# Patient Record
Sex: MALE | Race: OTHER | HISPANIC OR LATINO | URBAN - METROPOLITAN AREA
[De-identification: names, ages, dates, MRNs, and addresses within clinical notes are randomized per-mention and may not be internally consistent; named-entity substitution may affect disease eponyms.]

---

## 2023-02-16 ENCOUNTER — EMERGENCY (EMERGENCY)
Facility: HOSPITAL | Age: 23
LOS: 1 days | Discharge: HOME | End: 2023-02-16
Attending: EMERGENCY MEDICINE | Admitting: EMERGENCY MEDICINE
Payer: SELF-PAY

## 2023-02-16 VITALS
WEIGHT: 123.46 LBS | SYSTOLIC BLOOD PRESSURE: 119 MMHG | OXYGEN SATURATION: 98 % | HEART RATE: 78 BPM | DIASTOLIC BLOOD PRESSURE: 76 MMHG | HEIGHT: 61 IN | TEMPERATURE: 98 F | RESPIRATION RATE: 16 BRPM

## 2023-02-16 VITALS
DIASTOLIC BLOOD PRESSURE: 66 MMHG | OXYGEN SATURATION: 97 % | RESPIRATION RATE: 16 BRPM | HEART RATE: 68 BPM | TEMPERATURE: 98 F | SYSTOLIC BLOOD PRESSURE: 121 MMHG

## 2023-02-16 LAB
ALBUMIN SERPL ELPH-MCNC: 4 G/DL — SIGNIFICANT CHANGE UP (ref 3.3–5)
ALP SERPL-CCNC: 82 U/L — SIGNIFICANT CHANGE UP (ref 30–120)
ALT FLD-CCNC: 25 U/L DA — SIGNIFICANT CHANGE UP (ref 10–60)
ANION GAP SERPL CALC-SCNC: 6 MMOL/L — SIGNIFICANT CHANGE UP (ref 5–17)
APPEARANCE UR: CLEAR — SIGNIFICANT CHANGE UP
AST SERPL-CCNC: 26 U/L — SIGNIFICANT CHANGE UP (ref 10–40)
BACTERIA # UR AUTO: NEGATIVE — SIGNIFICANT CHANGE UP
BASOPHILS # BLD AUTO: 0.05 K/UL — SIGNIFICANT CHANGE UP (ref 0–0.2)
BASOPHILS NFR BLD AUTO: 0.7 % — SIGNIFICANT CHANGE UP (ref 0–2)
BILIRUB SERPL-MCNC: 0.2 MG/DL — SIGNIFICANT CHANGE UP (ref 0.2–1.2)
BILIRUB UR-MCNC: NEGATIVE — SIGNIFICANT CHANGE UP
BUN SERPL-MCNC: 10 MG/DL — SIGNIFICANT CHANGE UP (ref 7–23)
CALCIUM SERPL-MCNC: 8.9 MG/DL — SIGNIFICANT CHANGE UP (ref 8.4–10.5)
CHLORIDE SERPL-SCNC: 104 MMOL/L — SIGNIFICANT CHANGE UP (ref 96–108)
CO2 SERPL-SCNC: 29 MMOL/L — SIGNIFICANT CHANGE UP (ref 22–31)
COLOR SPEC: YELLOW — SIGNIFICANT CHANGE UP
CREAT SERPL-MCNC: 0.62 MG/DL — SIGNIFICANT CHANGE UP (ref 0.5–1.3)
DIFF PNL FLD: NEGATIVE — SIGNIFICANT CHANGE UP
EGFR: 139 ML/MIN/1.73M2 — SIGNIFICANT CHANGE UP
EOSINOPHIL # BLD AUTO: 0.05 K/UL — SIGNIFICANT CHANGE UP (ref 0–0.5)
EOSINOPHIL NFR BLD AUTO: 0.7 % — SIGNIFICANT CHANGE UP (ref 0–6)
EPI CELLS # UR: NEGATIVE — SIGNIFICANT CHANGE UP
GLUCOSE SERPL-MCNC: 106 MG/DL — HIGH (ref 70–99)
GLUCOSE UR QL: NEGATIVE MG/DL — SIGNIFICANT CHANGE UP
HCT VFR BLD CALC: 46.2 % — SIGNIFICANT CHANGE UP (ref 39–50)
HGB BLD-MCNC: 15.7 G/DL — SIGNIFICANT CHANGE UP (ref 13–17)
IMM GRANULOCYTES NFR BLD AUTO: 0.1 % — SIGNIFICANT CHANGE UP (ref 0–0.9)
KETONES UR-MCNC: NEGATIVE — SIGNIFICANT CHANGE UP
LEUKOCYTE ESTERASE UR-ACNC: NEGATIVE — SIGNIFICANT CHANGE UP
LIDOCAIN IGE QN: 156 U/L — SIGNIFICANT CHANGE UP (ref 73–393)
LYMPHOCYTES # BLD AUTO: 2.33 K/UL — SIGNIFICANT CHANGE UP (ref 1–3.3)
LYMPHOCYTES # BLD AUTO: 33.4 % — SIGNIFICANT CHANGE UP (ref 13–44)
MCHC RBC-ENTMCNC: 30 PG — SIGNIFICANT CHANGE UP (ref 27–34)
MCHC RBC-ENTMCNC: 34 GM/DL — SIGNIFICANT CHANGE UP (ref 32–36)
MCV RBC AUTO: 88.3 FL — SIGNIFICANT CHANGE UP (ref 80–100)
MONOCYTES # BLD AUTO: 0.51 K/UL — SIGNIFICANT CHANGE UP (ref 0–0.9)
MONOCYTES NFR BLD AUTO: 7.3 % — SIGNIFICANT CHANGE UP (ref 2–14)
NEUTROPHILS # BLD AUTO: 4.03 K/UL — SIGNIFICANT CHANGE UP (ref 1.8–7.4)
NEUTROPHILS NFR BLD AUTO: 57.8 % — SIGNIFICANT CHANGE UP (ref 43–77)
NITRITE UR-MCNC: NEGATIVE — SIGNIFICANT CHANGE UP
NRBC # BLD: 0 /100 WBCS — SIGNIFICANT CHANGE UP (ref 0–0)
PH UR: 6.5 — SIGNIFICANT CHANGE UP (ref 5–8)
PLATELET # BLD AUTO: 261 K/UL — SIGNIFICANT CHANGE UP (ref 150–400)
POTASSIUM SERPL-MCNC: 4.5 MMOL/L — SIGNIFICANT CHANGE UP (ref 3.5–5.3)
POTASSIUM SERPL-SCNC: 4.5 MMOL/L — SIGNIFICANT CHANGE UP (ref 3.5–5.3)
PROT SERPL-MCNC: 7.3 G/DL — SIGNIFICANT CHANGE UP (ref 6–8.3)
PROT UR-MCNC: 15 MG/DL
RBC # BLD: 5.23 M/UL — SIGNIFICANT CHANGE UP (ref 4.2–5.8)
RBC # FLD: 12 % — SIGNIFICANT CHANGE UP (ref 10.3–14.5)
RBC CASTS # UR COMP ASSIST: SIGNIFICANT CHANGE UP /HPF (ref 0–4)
SODIUM SERPL-SCNC: 139 MMOL/L — SIGNIFICANT CHANGE UP (ref 135–145)
SP GR SPEC: 1 — LOW (ref 1.01–1.02)
UROBILINOGEN FLD QL: NEGATIVE MG/DL — SIGNIFICANT CHANGE UP
WBC # BLD: 6.98 K/UL — SIGNIFICANT CHANGE UP (ref 3.8–10.5)
WBC # FLD AUTO: 6.98 K/UL — SIGNIFICANT CHANGE UP (ref 3.8–10.5)
WBC UR QL: SIGNIFICANT CHANGE UP

## 2023-02-16 PROCEDURE — 96374 THER/PROPH/DIAG INJ IV PUSH: CPT | Mod: XU

## 2023-02-16 PROCEDURE — 74174 CTA ABD&PLVS W/CONTRAST: CPT | Mod: MA

## 2023-02-16 PROCEDURE — 80053 COMPREHEN METABOLIC PANEL: CPT

## 2023-02-16 PROCEDURE — 81001 URINALYSIS AUTO W/SCOPE: CPT

## 2023-02-16 PROCEDURE — 99284 EMERGENCY DEPT VISIT MOD MDM: CPT

## 2023-02-16 PROCEDURE — 96375 TX/PRO/DX INJ NEW DRUG ADDON: CPT | Mod: XU

## 2023-02-16 PROCEDURE — 99284 EMERGENCY DEPT VISIT MOD MDM: CPT | Mod: 25

## 2023-02-16 PROCEDURE — 83690 ASSAY OF LIPASE: CPT

## 2023-02-16 PROCEDURE — 85025 COMPLETE CBC W/AUTO DIFF WBC: CPT

## 2023-02-16 PROCEDURE — 87086 URINE CULTURE/COLONY COUNT: CPT

## 2023-02-16 PROCEDURE — 74174 CTA ABD&PLVS W/CONTRAST: CPT | Mod: 26,MA

## 2023-02-16 PROCEDURE — 36415 COLL VENOUS BLD VENIPUNCTURE: CPT

## 2023-02-16 PROCEDURE — 96361 HYDRATE IV INFUSION ADD-ON: CPT

## 2023-02-16 RX ORDER — SODIUM CHLORIDE 9 MG/ML
1000 INJECTION INTRAMUSCULAR; INTRAVENOUS; SUBCUTANEOUS ONCE
Refills: 0 | Status: COMPLETED | OUTPATIENT
Start: 2023-02-16 | End: 2023-02-16

## 2023-02-16 RX ORDER — ONDANSETRON 8 MG/1
4 TABLET, FILM COATED ORAL ONCE
Refills: 0 | Status: COMPLETED | OUTPATIENT
Start: 2023-02-16 | End: 2023-02-16

## 2023-02-16 RX ORDER — PANTOPRAZOLE SODIUM 20 MG/1
40 TABLET, DELAYED RELEASE ORAL ONCE
Refills: 0 | Status: COMPLETED | OUTPATIENT
Start: 2023-02-16 | End: 2023-02-16

## 2023-02-16 RX ADMIN — PANTOPRAZOLE SODIUM 40 MILLIGRAM(S): 20 TABLET, DELAYED RELEASE ORAL at 10:21

## 2023-02-16 RX ADMIN — SODIUM CHLORIDE 1000 MILLILITER(S): 9 INJECTION INTRAMUSCULAR; INTRAVENOUS; SUBCUTANEOUS at 10:21

## 2023-02-16 RX ADMIN — ONDANSETRON 4 MILLIGRAM(S): 8 TABLET, FILM COATED ORAL at 10:21

## 2023-02-16 NOTE — ED PROVIDER NOTE - CLINICAL SUMMARY MEDICAL DECISION MAKING FREE TEXT BOX
Patient complaining of epigastric burning pain since yesterday afternoon with nausea and vomiting this morning which she describes as reddish-brown in color.  Patient denies fevers chills chest pain shortness of breath cough diarrhea urinary complaints. No bloody or black stools.  No history of abdomen prior abdominal surgeries.  No history of ulcers. Patient relates his boyfriend has similar symptoms  PMD none gastroenterologist none    Plan Labs IV fluid Protonix Zofran CTA abdomen  Differential including but not limited to esophagitis gastritis reflux GI bleed pancreatitis cholecystitis

## 2023-02-16 NOTE — ED PROVIDER NOTE - DIFFERENTIAL DIAGNOSIS
Differential including but not limited to esophagitis gastritis reflux GI bleed pancreatitis cholecystitis Differential Diagnosis

## 2023-02-16 NOTE — ED PROVIDER NOTE - OBJECTIVE STATEMENT
Patient complaining of epigastric burning pain since yesterday afternoon with nausea and vomiting this morning which she describes as reddish-brown in color.  Patient denies fevers chills chest pain shortness of breath cough diarrhea urinary complaints. No bloody or black stools.  No history of abdomen prior abdominal surgeries.  No history of ulcers. Patient relates his boyfriend has similar symptoms  PMD none gastroenterologist none

## 2023-02-16 NOTE — ED ADULT TRIAGE NOTE - CHIEF COMPLAINT QUOTE
" I been throwing up and nauseous since yesterday " (+) Diarrhea PMH GERD on prescribed medication but not taking - ran out

## 2023-02-16 NOTE — ED ADULT NURSE NOTE - OBJECTIVE STATEMENT
23 y/o male received aox4 ambulatory c/o sudden onset of nausea and vomiting today and several episodes of mildly watery diarrhea.

## 2023-02-16 NOTE — ED PROVIDER NOTE - ENMT, MLM
From: PATRICE SAENZ  To: VINICIUS Sol  Sent: 8/14/2020 6:29 PM CDT  Subject: Lab Test or Test Related Question    I noticed that there is an ultrasound procedure under upcoming tests. I am not sure what that is for?    Thanks    Angella  
Responded to patient: The pending ultrasound is to get a better look at your thyroid, as requested to evaluate if you have Hasimoto's disease. Please contact [phone] to schedule.     Let me know if you have any questions,     Office of Lauren Kerr NP  
Airway patent. Mouth with normal mucosa. Throat has no vesicles, no oropharyngeal exudates and uvula is midline.

## 2023-02-16 NOTE — ED PROVIDER NOTE - NSFOLLOWUPINSTRUCTIONS_ED_ALL_ED_FT
Nexium OTC daily      Gastritis, Adult    Outline of an adult's lower body with a close-up of the stomach, showing inflammation and an ulcer inside the stomach.    Gastritis is inflammation of the stomach. There are two kinds of gastritis:  •Acute gastritis. This kind develops suddenly.      •Chronic gastritis. This kind is much more common. It develops slowly and lasts for a long time.      Gastritis happens when the lining of the stomach becomes weak or gets damaged. Without treatment, gastritis can lead to stomach bleeding and ulcers.      What are the causes?    This condition may be caused by:  •An infection.      •Drinking too much alcohol.      •Certain medicines. These include steroids, antibiotics, and some over-the-counter medicines, such as aspirin or ibuprofen.      •Having too much acid in the stomach.      •Having a disease of the stomach.      Other causes may include:  •An allergic reaction.      •Some cancer treatments (radiation).      •Smoking cigarettes or the use of products that contain nicotine or tobacco.      In some cases, the cause of this condition is not known.      What increases the risk?    •Having a disease of the intestines.      •Having a disease in which the body's immune system attacks the body (autoimmune disease), such as Crohn's disease.      •Using aspirin or ibuprofen and other NSAIDs to treat other conditions, such as heart disease or chronic pain.      •Stress.        What are the signs or symptoms?    Symptoms of this condition include:  •Pain or a burning sensation in the upper abdomen.      •Nausea.      •Vomiting.      •An uncomfortable feeling of fullness after eating.      •Weight loss.      •Bad breath.      •Blood in your vomit or stool (feces).      In some cases, there are no symptoms.      How is this diagnosed?    This condition may be diagnosed based on your medical history, a physical exam, and tests. Tests may include:  •Your medical history and a description of your symptoms.      •A physical exam.    •Tests. These can include:  •Blood tests.      •Stool tests.      •A test in which a thin, flexible instrument with a light and a camera is passed down the esophagus and into the stomach (upper endoscopy).      •A test in which a tissue sample is removed to look at it under a microscope (biopsy).          How is this treated?    This condition may be treated with medicines. The medicines that are used vary depending on the cause of the gastritis.  •If the condition is caused by a bacterial infection, you may be given antibiotic medicines.      •If the condition is caused by too much acid in the stomach, you may be given medicines called H2 blockers, proton pump inhibitors, or antacids.      Treatment may also involve stopping the use of certain medicines such as aspirin or ibuprofen and other NSAIDs.      Follow these instructions at home:    Medicines     •Take over-the-counter and prescription medicines only as told by your health care provider.      •If you were prescribed an antibiotic medicine, take it as told by your health care provider. Do not stop taking the antibiotic even if you start to feel better.      Alcohol use   • Do not drink alcohol if:  •Your health care provider tells you not to drink.      •You are pregnant, may be pregnant, or are planning to become pregnant.      •If you drink alcohol:•Limit your use to:  •0–1 drink a day for women.      •0–2 drinks a day for men.        •Know how much alcohol is in your drink. In the [place] one drink equals one 12 oz bottle of beer (355 mL), one 5 oz glass of wine (148 mL), or one 1½ oz glass of hard liquor (44 mL).          General instructions   A comparison of three sample cups showing dark yellow, yellow, and pale yellow urine.   •Eat small, frequent meals instead of large meals.      •Avoid foods and drinks that make your symptoms worse.      •Talk with your health care provider about ways to manage stress, such as getting regular exercise or practicing deep breathing, meditation, or yoga.      • Do not use any products that contain nicotine or tobacco. These products include cigarettes, chewing tobacco, and vaping devices, such as e-cigarettes. If you need help quitting, ask your health care provider.      •Drink enough fluid to keep your urine pale yellow.      •Keep all follow-up visits. This is important.        Contact a health care provider if:    •Your symptoms get worse.      •Your abdominal pain gets worse.      •Your symptoms return after treatment.      •You have a fever.        Get help right away if:    •You vomit blood or a substance that looks like coffee grounds.      •You have black or dark red stools.      •You are unable to keep fluids down.      These symptoms may represent a serious problem that is an emergency. Do not wait to see if the symptoms will go away. Get medical help right away. Call your local emergency services (911 in the [place] Do not drive yourself to the hospital.       Summary    •Gastritis is inflammation of the lining of the stomach that can occur suddenly (acute) or develop slowly over time (chronic).      •This condition is diagnosed with a medical history, a physical exam, or tests.      •This condition may be treated with medicines to treat infection or medicines to reduce the amount of acid in your stomach.      •Follow your health care provider's instructions about taking medicines, making changes to your diet, and knowing when to call for help.      This information is not intended to replace advice given to you by your health care provider. Make sure you discuss any questions you have with your health care provider.

## 2023-02-16 NOTE — ED PROVIDER NOTE - CARE PROVIDER_API CALL
Hoang Rizo ()  Internal Medicine  [address] [place] [address]  Phone: [phone]  Fax: [phone]  Follow Up Time: 1-3 Days

## 2023-02-16 NOTE — ED PROVIDER NOTE - NS ED ATTENDING STATEMENT MOD
Dr. Santoyo:    17: Records reviewed. Patient has a complicated history, which took a while to review. He has had issues with obesity, and he underwent a philly-en-Y gastric bypass in , at which time he lost 150 pounds. His weight has fluctuated slightly, but he has maintained a reasonable weight since then. He has had problems with a ventral hernia with some complications after repair in 2016. He was hospitalized in 2017 for an anterior abdominal wall mass that was drained twice. Dr. Agustin Hoffman is managing that problem. He has mesh over the majority of his upper abdomen. He has chronic loose stools, which may be related to his gastric bypass surgery - an extensive evaluation has been done previously by Dr. Raffy Thayer. He last saw him in 2017, but he has switched his care over to me today due to convenient location. He also has seen Dr. Akash Hussein in my practice for screening/surveillance colonoscopies. His last colonoscopy was in , and he had one tubular adenoma removed. He is due for a surveillance colonoscopy at this time. In addition, he has HDZ/cryptogenic cirrhosis. He had a liver biopsy on 13 that was consistent with that diagnosis since previous laboratory testing was inconclusive. Of note, his mother  of liver disease. He has no signs of decompensation, but he has persistent thrombocytopenia/anemia. He needs screening for HCC and esophageal varices at this time. Last EGD in  did not show any evidence of portal hypertension.   18: Patient presents for a new problem - fecal incontinence. He states that for months (even before seeing me), he would have the urge to go to the bathroom but could not make it in time. Stools are usually loose or watery. May have some malabsorption. Needs liver cancer screening and hepatitis A/B vaccination.   18: Patient returns for urgent consultation. Apparently, the patient became more confused about a week after seeing me. He was slurring his speech and acting very lethargic. At the same time, he was irritable and belligerent, which apparently "not like him", according to his wife and daughter (who works in Dr. Frankel's office). He was taking naps during the day, and his driving became erratic. (No longer driving at this time.) He went to the ER twice, and he was given a rx for Xifaxan which has helped him immensely. No signs of liver cancer, ascites/SBP, or bleeding. Not sure why he became encephalopathic - awake and alert in my office.   3/26/18: Patient returns for follow-up. He was admitted from 3/3/18-3/5/18 for another exacerbation of hepatic encephalopathy. Resolved quickly with lactulose and rifaximin. He appears clear-headed today. He has swollen legs and mildly distended abdomen. Needs refills of meds. On 3/9/18, he went to see Dr. Gunner Escalante at [facility]. He is being considered by possible liver transplant.   18: Patient returns for follow-up. He feels fine except for increased swelling in his legs. Does not want to use compression stockings. Willing to try to increase diuretics with close monitoring. Seeing [facility] next month. Not listed yet - needs cardiac MRI and reimaging of a lung nodule in a few months. Up to date with colon cancer screening - I found one hyperplastic polyp and one adenomatous polyp on his last colonoscopy in 2017. I also performed an EGD at that time which did not show any varices.   18: Patient presents for follow-up of HDZ cirrhosis. On 8/10/18, I performed a screening EGD that showed evidence of a gastric bypass but no portal hypertension. Followed by Dr. Ken Escalante at [facility] - currently listed with MELD-Na of 16. Encephalopathy seems to be the main sign of decompensation. Needs HCC screening. Was vaccinated for hepatitis B. Feels tired but otherwise well.   19: Patient had a liver transplant on 3/12/19 at [facility] and follows up with Dr. Escalante. Doing amazingly well. He has followed up with hematology who were concerned about his chronic anemia (macrocytic). He has had a gastric bypass and has a chronic anemia due to chronic iron malabsorption. However, he was found to have a hemoccult positive stool. Will rule out GI blood loss.  10/16/20:  Patient presents for urgent evaluation of diarrhea.  Patient had an EGD/colonoscopy on 19 that revealed a normal philly-en-Y anatomy as well as benign lipomas in the colon without any other abnormalities.  Patient mainly is having issues with fecal incontinence, almost every other day.  He wears an adult undergarment.  It is not clear if he may have altered sensation - he just cannot make it to the commode in time.  His stools are loose - he believes that he submitted a stool sample recently that was negative for infection.  From a liver standpoint, he is still followed closely by Dr. Gunner Escalante.  He has no issues there, but his current list of meds still includes furosemide, spironolactone, lactulose, and Xifaxan.  He is not sure what pills he takes, but he does not take the lactulose anymore.  Will confirm with Dr. Escalante.  Today, Mr. Rivers presents for a colon cancer screening/medication refill. His last colonoscopy was 2019. He had "many small -mouthed diverticula" in the sigmoid and descending colon. There was also one large lipoma in the descending colon as well as one small lipoma in the transverse colon. The rest of the exam was normal. He is not due at this time for a colonoscopy. He reports he is doing well on his medication for diarrhea. He is having 3-4 bowel movements a day, which are formed. He is taking imodium and dicyclomine. His wife is wondering if he can get a 90 day prescription for the dicyclomine. He is having an EUS with Dr. Santoyo in two weeks.
Attending Only

## 2023-02-16 NOTE — ED PROVIDER NOTE - PATIENT PORTAL LINK FT
You can access the FollowMyHealth Patient Portal offered by [facility] by registering at the following website: [email or web address]. By joining [facility]’s FollowMyHealth portal, you will also be able to view your health information using other applications (apps) compatible with our system.

## 2023-02-17 LAB
CULTURE RESULTS: SIGNIFICANT CHANGE UP
SPECIMEN SOURCE: SIGNIFICANT CHANGE UP

## 2024-07-05 NOTE — ED ADULT NURSE NOTE - SUICIDE SCREENING QUESTION 1
Intensive Care Daily Quality Rounding Checklist        ICU Team Members: Dr. Millan, Resident Dr. Poe, Bedside nurse, Charge nurse, Respiratory therapist      ICU Day #: 3     SOFA Score: 8     Intubation Date: 7/3/24     Ventilator Day #: 3     Central Line Insertion Date: 7/3/24                                                    Day #: 3                                                    Indication: CVCIndication: Administration of vasopressors or vesicant medications      Arterial Line Insertion Date: 7/3/24                             Day #: 3     Temporary Hemodialysis Catheter Insertion Date: n/a                             Day # n/a     DVT Prophylaxis: Heparin gtt    GI Prophylaxis: Protonix     Coto Catheter Insertion Date: 7/3/24                                        Day #: 3                             Indications: Need for fluid management of the critically ill patient in a critical care setting                             Continued need (if yes, reason documented and discussed with physician): Yes     Skin Issues/ Wounds and ordered treatment discussed on rounds: SOS precautions     Goals/ Plans for the Day: Monitor labs and vitals, treat/replace as needed. Wean vent or pressors as tolerated. Daily restraint order for safety. For echo. Possible cardiac intervention.  Resume home meds. Continue cefepime.  Exchange TLC and trend venous pH.    Reviewed plan and goals for day with patient and/or representative:  Yes, at bedside.  
No